# Patient Record
Sex: FEMALE | Race: WHITE | Employment: FULL TIME | URBAN - METROPOLITAN AREA
[De-identification: names, ages, dates, MRNs, and addresses within clinical notes are randomized per-mention and may not be internally consistent; named-entity substitution may affect disease eponyms.]

---

## 2018-07-20 ENCOUNTER — OFFICE VISIT (OUTPATIENT)
Dept: FAMILY MEDICINE CLINIC | Age: 33
End: 2018-07-20
Payer: COMMERCIAL

## 2018-07-20 VITALS
WEIGHT: 138 LBS | RESPIRATION RATE: 16 BRPM | HEIGHT: 60 IN | BODY MASS INDEX: 27.09 KG/M2 | SYSTOLIC BLOOD PRESSURE: 121 MMHG | HEART RATE: 69 BPM | DIASTOLIC BLOOD PRESSURE: 80 MMHG

## 2018-07-20 DIAGNOSIS — Z00.00 WELL ADULT EXAM: Primary | ICD-10-CM

## 2018-07-20 PROCEDURE — 99385 PREV VISIT NEW AGE 18-39: CPT | Performed by: FAMILY MEDICINE

## 2018-07-20 ASSESSMENT — PATIENT HEALTH QUESTIONNAIRE - PHQ9
2. FEELING DOWN, DEPRESSED OR HOPELESS: 0
SUM OF ALL RESPONSES TO PHQ QUESTIONS 1-9: 0
1. LITTLE INTEREST OR PLEASURE IN DOING THINGS: 0
SUM OF ALL RESPONSES TO PHQ9 QUESTIONS 1 & 2: 0

## 2018-07-20 NOTE — PROGRESS NOTES
[facility] PHYSICIANS  COMPREHENSIVE CARE  [facility] [place]  [address] [address]  Dept: [phone]      Elver Thapa is a 29 y.o. female who presents today for follow up on her  medical conditions as noted below. Chief Complaint   Patient presents with   [address]     [facility] care, labs       There is no problem list on file for this patient. No past medical history on file. Past Surgical History:   Procedure Laterality Date    NOSE SURGERY      SKIN CANCER EXCISION       Family History   Problem Relation Age of Onset    Cancer Mother         ovarian cancer    Other Mother         Parkinson       No current outpatient prescriptions on file. No current facility-administered medications for this visit. ALLERGIES:  No Known Allergies    Social History   Substance Use Topics    Smoking status: Never Smoker    Smokeless tobacco: Never Used    Alcohol use Yes      Comment: social        No results found for: LDLCALC, LDLCHOLESTEROL, HDL, BUN, CREATININE, GLUCOSE, LABA1C, LABMICR           Subjective:      HPI  She is here today as a new patient to establish care she is having no complaints or problems she did have an episode of skin cancer and follows with a dermatologist regularly in [place]    Review of Systems:     Constitutional: Negative for fever, appetite change and fatigue. Family social and medical history reviewed and unchanged     HENT: Negative. Negative for nosebleeds, trouble swallowing and neck pain. Eyes: Negative for photophobia and visual disturbance. Respiratory: Negative. Negative for chest tightness and shortness of breath. Cardiovascular: Negative. Negative for chest pain and leg swelling. Gastrointestinal: Negative. Negative for abdominal pain and blood in stool. Endocrine: Negative for cold intolerance and polyuria. Genitourinary: Negative for dysuria and hematuria. Musculoskeletal: Negative. Skin: Negative for rash. Allergic/Immunologic: Negative. Neurological: Negative. Negative for dizziness, weakness and numbness. Hematological: Negative. Negative for adenopathy. Does not bruise/bleed easily. Psychiatric/Behavioral: Negative for sleep disturbance, dysphoric mood and  decreased concentration. The patient is not nervous/anxious. Objective:     Physical Exam:     Nursing note and vitals reviewed. /80   Pulse 69   Resp 16   Ht 5' (1.524 m)   Wt 138 lb (62.6 kg)   Breastfeeding? No   BMI 26.95 kg/m²   Constitutional: She is oriented to person, place, and time. She   appears well-developed and well-nourished. HENT:   Head: Normocephalic and atraumatic. Right Ear: External ear normal. Tympanic membrane is not erythematous. No middle ear effusion. Left Ear: External ear normal. Tympanic membrane is not erythematous. No middle ear effusion. Nose: No mucosal edema. Mouth/Throat: Oropharynx is clear and moist. No posterior oropharyngeal erythema. Eyes: Conjunctivae and EOM are normal. Pupils are equal, round, and reactive to light. Neck: Normal range of motion. Neck supple. No thyromegaly present. Cardiovascular: Normal rate, regular rhythm and normal heart sounds. No murmur heard. Pulmonary/Chest: Effort normal and breath sounds normal. She has no wheezes. Shehas no rales. Abdominal: Soft. Bowel sounds are normal. She exhibits no distension and no mass. There is no tenderness. There is no rebound and no guarding. Genitourinary/Anorectal:deferred  Musculoskeletal: Normal range of motion. She exhibits no edema or tenderness. Lymphadenopathy: She has no cervical adenopathy. Neurological: She is alert and oriented to person, place, and time. She has normal reflexes. Skin: Skin is warm and dry. No rash noted. Psychiatric: She has a normal mood and affect. Her   behavior is normal.       Assessment:      1.  Well adult exam          Plan:      Call or return to clinic prn if these symptoms worsen or fail to improve as anticipated. I have reviewed the instructions with the patient, answering all questions to her satisfaction. No Follow-up on file. Orders Placed This Encounter   Procedures    CBC Auto Differential     Standing Status:   Future     Standing Expiration Date:   7/20/2019    Comprehensive Metabolic Panel     Standing Status:   Future     Standing Expiration Date:   7/20/2019    Lipid Panel     Standing Status:   Future     Standing Expiration Date:   7/20/2019     Order Specific Question:   Is Patient Fasting?/# of Hours     Answer:   yes    T4, Free     Standing Status:   Future     Standing Expiration Date:   7/20/2019    Thyroid Peroxidase Antibody     Standing Status:   Future     Standing Expiration Date:   7/20/2019    TSH without Reflex     Standing Status:   Future     Standing Expiration Date:   7/20/2019    Vitamin D 25 Hydroxy     Standing Status:   Future     Standing Expiration Date:   7/20/2019    PTH, Intact     Standing Status:   Future     Standing Expiration Date:   7/20/2019     No orders of the defined types were placed in this encounter. The laboratory studies and a follow-up p.r.n.   Electronically signed by Mango Westbrook DO on 7/20/2018 at 10:15 AM

## 2018-07-31 ENCOUNTER — OFFICE VISIT (OUTPATIENT)
Dept: OBGYN CLINIC | Age: 33
End: 2018-07-31
Payer: COMMERCIAL

## 2018-07-31 ENCOUNTER — HOSPITAL ENCOUNTER (OUTPATIENT)
Age: 33
Setting detail: SPECIMEN
Discharge: HOME | End: 2018-07-31
Payer: COMMERCIAL

## 2018-07-31 VITALS
HEIGHT: 60 IN | SYSTOLIC BLOOD PRESSURE: 110 MMHG | DIASTOLIC BLOOD PRESSURE: 73 MMHG | BODY MASS INDEX: 27.09 KG/M2 | HEART RATE: 66 BPM | WEIGHT: 138 LBS

## 2018-07-31 DIAGNOSIS — N94.6 DYSMENORRHEA: ICD-10-CM

## 2018-07-31 DIAGNOSIS — Z11.3 SCREENING EXAMINATION FOR STD (SEXUALLY TRANSMITTED DISEASE): ICD-10-CM

## 2018-07-31 DIAGNOSIS — Z01.419 WOMEN'S ANNUAL ROUTINE GYNECOLOGICAL EXAMINATION: Primary | ICD-10-CM

## 2018-07-31 PROBLEM — Z80.41 FAMILY HISTORY OF OVARIAN CANCER: Status: ACTIVE | Noted: 2018-07-31

## 2018-07-31 PROBLEM — Z82.0 FAMILY HISTORY OF PARKINSON DISEASE: Status: ACTIVE | Noted: 2018-07-31

## 2018-07-31 LAB
HIV AG/AB: NONREACTIVE
T. PALLIDUM, IGG: NONREACTIVE

## 2018-07-31 PROCEDURE — 99385 PREV VISIT NEW AGE 18-39: CPT | Performed by: ADVANCED PRACTICE MIDWIFE

## 2018-07-31 RX ORDER — DESOGESTREL AND ETHINYL ESTRADIOL 0.15-0.03
1 KIT ORAL DAILY
Qty: 1 PACKET | Refills: 12 | Status: SHIPPED | OUTPATIENT
Start: 2018-07-31 | End: 2019-03-12 | Stop reason: SDUPTHER

## 2018-07-31 ASSESSMENT — ENCOUNTER SYMPTOMS
RESPIRATORY NEGATIVE: 1
GASTROINTESTINAL NEGATIVE: 1
EYES NEGATIVE: 1

## 2018-08-01 LAB
CHLAMYDIA BY THIN PREP: NEGATIVE
N. GONORRHOEAE DNA, THIN PREP: NEGATIVE

## 2018-08-02 LAB
HPV SAMPLE: NORMAL
HPV SOURCE: NORMAL
HPV, GENOTYPE 16: NOT DETECTED
HPV, GENOTYPE 18: NOT DETECTED
HPV, HIGH RISK OTHER: NOT DETECTED
HPV, INTERPRETATION: NORMAL

## 2018-08-16 LAB — CYTOLOGY REPORT: NORMAL

## 2018-08-20 PROBLEM — R87.612 LOW GRADE SQUAMOUS INTRAEPITHELIAL LESION ON CYTOLOGIC SMEAR OF CERVIX (LGSIL): Status: ACTIVE | Noted: 2018-08-20

## 2018-08-21 ENCOUNTER — TELEPHONE (OUTPATIENT)
Dept: OBGYN CLINIC | Age: 33
End: 2018-08-21

## 2018-08-21 NOTE — TELEPHONE ENCOUNTER
----- Message from Quincy Artis, Valentina Domínguezumas Supa sent at 8/20/2018  4:44 PM EDT -----  Regarding: abnormal pap  Johnkeyonna Louiemary,    Can you call this patient. She has LSIL neg HPV. She will need to schedule with Dr. Bradley Paris for a colpo. Thank you,    [facility]  ----- Message -----  From: Raulito Belcher Incoming Lab Results From [facility]  Sent: 7/31/2018   7:16 PM  To:  VERONICA Pedro CNM

## 2018-08-29 ENCOUNTER — HOSPITAL ENCOUNTER (OUTPATIENT)
Age: 33
Setting detail: SPECIMEN
Discharge: HOME | End: 2018-08-29
Payer: COMMERCIAL

## 2018-08-29 ENCOUNTER — PROCEDURE VISIT (OUTPATIENT)
Dept: OBGYN CLINIC | Age: 33
End: 2018-08-29
Payer: COMMERCIAL

## 2018-08-29 VITALS
WEIGHT: 139 LBS | DIASTOLIC BLOOD PRESSURE: 78 MMHG | HEIGHT: 60 IN | BODY MASS INDEX: 27.29 KG/M2 | HEART RATE: 84 BPM | SYSTOLIC BLOOD PRESSURE: 115 MMHG

## 2018-08-29 DIAGNOSIS — Z32.02 NEGATIVE PREGNANCY TEST: ICD-10-CM

## 2018-08-29 DIAGNOSIS — R87.612 LOW GRADE SQUAMOUS INTRAEPITHELIAL LESION ON CYTOLOGIC SMEAR OF CERVIX (LGSIL): Primary | ICD-10-CM

## 2018-08-29 LAB
CONTROL: PRESENT
PREGNANCY TEST URINE, POC: NEGATIVE

## 2018-08-29 PROCEDURE — 57456 ENDOCERV CURETTAGE W/SCOPE: CPT | Performed by: OBSTETRICS & GYNECOLOGY

## 2018-08-29 PROCEDURE — 81025 URINE PREGNANCY TEST: CPT | Performed by: OBSTETRICS & GYNECOLOGY

## 2018-08-31 LAB — SURGICAL PATHOLOGY REPORT: NORMAL

## 2018-09-05 ENCOUNTER — OFFICE VISIT (OUTPATIENT)
Dept: OBGYN CLINIC | Age: 33
End: 2018-09-05
Payer: COMMERCIAL

## 2018-09-05 VITALS
SYSTOLIC BLOOD PRESSURE: 122 MMHG | HEART RATE: 81 BPM | DIASTOLIC BLOOD PRESSURE: 82 MMHG | BODY MASS INDEX: 26.31 KG/M2 | HEIGHT: 60 IN | WEIGHT: 134 LBS

## 2018-09-05 DIAGNOSIS — R87.612 LOW GRADE SQUAMOUS INTRAEPITHELIAL LESION ON CYTOLOGIC SMEAR OF CERVIX (LGSIL): ICD-10-CM

## 2018-09-05 DIAGNOSIS — R87.613 HGSIL ON CYTOLOGIC SMEAR OF CERVIX: Primary | ICD-10-CM

## 2018-09-05 PROCEDURE — 99213 OFFICE O/P EST LOW 20 MIN: CPT | Performed by: OBSTETRICS & GYNECOLOGY

## 2018-09-07 NOTE — PROGRESS NOTES
bilaterally. Extremities: No calf tenderness, DTR 2/4, and No edema bilaterally    Pelvic: Exam deferred. Assessment:   Diagnosis Orders   1. HGSIL on cytologic smear of cervix     2. Low grade squamous intraepithelial lesion on cytologic smear of cervix (LGSIL)         PLAN:  We discussed the option of LEEP vs. Conservative monitoring in six weeks, patient elects for monitoring and will attempt to improve her overall health in that time frame. Return to the office in 24 weeks. Counseled on preventative health maintenance follow-up. Patient was seen with total face to face time of 15 minutes. More than 50% of this visit was counseling and education regarding The primary encounter diagnosis was HGSIL on cytologic smear of cervix. A diagnosis of Low grade squamous intraepithelial lesion on cytologic smear of cervix (LGSIL) was also pertinent to this visit. and Results   as well as  counseling on preventative health maintenance follow-up.

## 2019-03-12 ENCOUNTER — HOSPITAL ENCOUNTER (OUTPATIENT)
Age: 34
Setting detail: SPECIMEN
Discharge: HOME | End: 2019-03-12
Payer: COMMERCIAL

## 2019-03-12 ENCOUNTER — OFFICE VISIT (OUTPATIENT)
Dept: OBGYN CLINIC | Age: 34
End: 2019-03-12
Payer: COMMERCIAL

## 2019-03-12 VITALS
HEART RATE: 62 BPM | DIASTOLIC BLOOD PRESSURE: 75 MMHG | SYSTOLIC BLOOD PRESSURE: 116 MMHG | BODY MASS INDEX: 29.12 KG/M2 | WEIGHT: 149.13 LBS

## 2019-03-12 DIAGNOSIS — N94.6 DYSMENORRHEA: ICD-10-CM

## 2019-03-12 DIAGNOSIS — R87.612 LOW GRADE SQUAMOUS INTRAEPITHELIAL LESION ON CYTOLOGIC SMEAR OF CERVIX (LGSIL): ICD-10-CM

## 2019-03-12 DIAGNOSIS — Z01.419 WELL WOMAN EXAM WITH ROUTINE GYNECOLOGICAL EXAM: Primary | ICD-10-CM

## 2019-03-12 DIAGNOSIS — R87.613 HGSIL ON CYTOLOGIC SMEAR OF CERVIX: ICD-10-CM

## 2019-03-12 PROCEDURE — 99395 PREV VISIT EST AGE 18-39: CPT | Performed by: OBSTETRICS & GYNECOLOGY

## 2019-03-12 RX ORDER — DESOGESTREL AND ETHINYL ESTRADIOL 0.15-0.03
1 KIT ORAL DAILY
Qty: 1 PACKET | Refills: 12 | Status: SHIPPED | OUTPATIENT
Start: 2019-03-12

## 2019-03-15 LAB
HPV SAMPLE: NORMAL
HPV, GENOTYPE 16: NOT DETECTED
HPV, GENOTYPE 18: NOT DETECTED
HPV, HIGH RISK OTHER: NOT DETECTED
HPV, INTERPRETATION: NORMAL
SPECIMEN DESCRIPTION: NORMAL

## 2019-03-18 DIAGNOSIS — Z00.00 WELL ADULT EXAM: ICD-10-CM

## 2019-03-18 LAB
ALBUMIN SERPL-MCNC: NORMAL G/DL
ALP BLD-CCNC: NORMAL U/L
ALT SERPL-CCNC: NORMAL U/L
ANION GAP SERPL CALCULATED.3IONS-SCNC: NORMAL MMOL/L
AST SERPL-CCNC: NORMAL U/L
BASOPHILS ABSOLUTE: NORMAL /ΜL
BASOPHILS RELATIVE PERCENT: NORMAL %
BILIRUB SERPL-MCNC: NORMAL MG/DL (ref 0.1–1.4)
BUN BLDV-MCNC: NORMAL MG/DL
CALCIUM SERPL-MCNC: NORMAL MG/DL
CHLORIDE BLD-SCNC: NORMAL MMOL/L
CO2: NORMAL MMOL/L
CREAT SERPL-MCNC: NORMAL MG/DL
EOSINOPHILS ABSOLUTE: NORMAL /ΜL
EOSINOPHILS RELATIVE PERCENT: NORMAL %
GFR CALCULATED: NORMAL
GLUCOSE BLD-MCNC: NORMAL MG/DL
HCT VFR BLD CALC: NORMAL % (ref 36–46)
HEMOGLOBIN: NORMAL G/DL (ref 12–16)
LYMPHOCYTES ABSOLUTE: NORMAL /ΜL
LYMPHOCYTES RELATIVE PERCENT: NORMAL %
MCH RBC QN AUTO: NORMAL PG
MCHC RBC AUTO-ENTMCNC: NORMAL G/DL
MCV RBC AUTO: NORMAL FL
MONOCYTES ABSOLUTE: NORMAL /ΜL
MONOCYTES RELATIVE PERCENT: NORMAL %
NEUTROPHILS ABSOLUTE: NORMAL /ΜL
NEUTROPHILS RELATIVE PERCENT: NORMAL %
PDW BLD-RTO: NORMAL %
PLATELET # BLD: NORMAL K/ΜL
PMV BLD AUTO: NORMAL FL
POTASSIUM SERPL-SCNC: NORMAL MMOL/L
PTH INTACT: 29
RBC # BLD: NORMAL 10^6/ΜL
SODIUM BLD-SCNC: NORMAL MMOL/L
T4 FREE: 1
TOTAL PROTEIN: NORMAL
TSH SERPL DL<=0.05 MIU/L-ACNC: 1.79 UIU/ML
VITAMIN D 25-HYDROXY: 17
VITAMIN D2, 25 HYDROXY: NORMAL
VITAMIN D3,25 HYDROXY: NORMAL
WBC # BLD: NORMAL 10^3/ML

## 2019-03-19 ENCOUNTER — PATIENT MESSAGE (OUTPATIENT)
Dept: FAMILY MEDICINE CLINIC | Age: 34
End: 2019-03-19

## 2019-03-25 LAB — CYTOLOGY REPORT: NORMAL

## 2019-04-08 ENCOUNTER — TELEPHONE (OUTPATIENT)
Dept: OBGYN CLINIC | Age: 34
End: 2019-04-08

## 2020-11-12 ENCOUNTER — OFFICE VISIT (OUTPATIENT)
Dept: ORTHOPEDIC SURGERY | Age: 35
End: 2020-11-12
Payer: COMMERCIAL

## 2020-11-12 ENCOUNTER — TELEPHONE (OUTPATIENT)
Dept: ORTHOPEDIC SURGERY | Age: 35
End: 2020-11-12

## 2020-11-12 VITALS
TEMPERATURE: 99.3 F | HEIGHT: 60 IN | HEART RATE: 62 BPM | BODY MASS INDEX: 28.47 KG/M2 | WEIGHT: 145 LBS | RESPIRATION RATE: 12 BRPM

## 2020-11-12 PROCEDURE — 20550 NJX 1 TENDON SHEATH/LIGAMENT: CPT | Performed by: ORTHOPAEDIC SURGERY

## 2020-11-12 PROCEDURE — 99203 OFFICE O/P NEW LOW 30 MIN: CPT | Performed by: ORTHOPAEDIC SURGERY

## 2020-11-12 RX ORDER — METHYLPREDNISOLONE 4 MG/1
TABLET ORAL
COMMUNITY
Start: 2020-11-06

## 2020-11-12 RX ORDER — METHYLPREDNISOLONE ACETATE 40 MG/ML
40 INJECTION, SUSPENSION INTRA-ARTICULAR; INTRALESIONAL; INTRAMUSCULAR; SOFT TISSUE ONCE
Status: COMPLETED | OUTPATIENT
Start: 2020-11-12 | End: 2020-11-12

## 2020-11-12 RX ORDER — IBUPROFEN 800 MG/1
800 TABLET ORAL EVERY 6 HOURS PRN
COMMUNITY
Start: 2020-11-06

## 2020-11-12 RX ORDER — LIDOCAINE HYDROCHLORIDE 10 MG/ML
4 INJECTION, SOLUTION INFILTRATION; PERINEURAL ONCE
Status: COMPLETED | OUTPATIENT
Start: 2020-11-12 | End: 2020-11-12

## 2020-11-12 RX ADMIN — LIDOCAINE HYDROCHLORIDE 4 ML: 10 INJECTION, SOLUTION INFILTRATION; PERINEURAL at 12:56

## 2020-11-12 RX ADMIN — METHYLPREDNISOLONE ACETATE 40 MG: 40 INJECTION, SUSPENSION INTRA-ARTICULAR; INTRALESIONAL; INTRAMUSCULAR; SOFT TISSUE at 12:56

## 2020-11-12 ASSESSMENT — ENCOUNTER SYMPTOMS
COUGH: 0
ABDOMINAL PAIN: 0
CHEST TIGHTNESS: 0
APNEA: 0
ABDOMINAL DISTENTION: 0
VOMITING: 0
NAUSEA: 0
DIARRHEA: 0
COLOR CHANGE: 0
CONSTIPATION: 0
SHORTNESS OF BREATH: 0

## 2020-11-12 NOTE — PROGRESS NOTES
[address] [facility]  [address] [place] [id number]  Dept: [phone]  Dept Fax: [phone]        Bilateral Foot: New Patient    Subjective:     Chief Complaint   Patient presents with    Foot Pain     Bilateral Heel     HPI:     Amanda Guzman presents today with bilateral foot pain. The patient is a Manager at [employer]. The left foot is worse than the right. The pain has been present since 09/01/2020, but the past couple of weeks have progressively gotten worse. The patient recalls no specific injury or trauma to either of the feet. The patient went to the [facility] on 11/06/2020 because of increasing pain in both feet. She was given Motrin 800 mgs and Methylprednisolone 4 mg to take as needed. The patient has tried the Motrin (3x daily) and the steroid pack with no improvement. The patient has also tried rolling out on a frozen water bottle, she bought new shoes at [address] [place] [address] [employer], soaked her feet in an ice bucket, and wore a night splint with some improvement. The patient got a neutral shoe from [address] [place], [place] [address] so that it will allow her to put an orthotic in it. The patient tried to go back to work yesterday, but had to leave early due to pain. The pain is now described as Carlos Artemio and shooting. The patient cannot be on her feet longer than an hour. There is significant pain with prolonged weight bearing. The feet have not swelled. There is no painful popping and clicking. It is stiff upon arising from sitting. It is painful to go up and down stairs and sit for a prolonged time. The patient has not had a cortisone injection. The patient has not tried physical therapy. The patient has not has surgery. The opposite foot is not okay. The patient no longer runs or exercises because of the pain. ROS:   Review of Systems   Constitutional: Positive for activity change. Negative for appetite change, fatigue and fever. Respiratory: Negative for apnea, cough, chest tightness and shortness of breath. Cardiovascular: Negative for chest pain, palpitations and leg swelling. Gastrointestinal: Negative for abdominal distention, abdominal pain, constipation, diarrhea, nausea and vomiting. Genitourinary: Negative for difficulty urinating, dysuria and hematuria. Musculoskeletal: Positive for myalgias. Negative for arthralgias, gait problem and joint swelling. Skin: Negative for color change and rash. Neurological: Negative for dizziness, weakness, numbness and headaches. Psychiatric/Behavioral: Negative for sleep disturbance. Past Medical History:    No past medical history on file. Past Surgical History:    Past Surgical History:   Procedure Laterality Date    NOSE SURGERY      SKIN CANCER EXCISION         CurrentMedications:   Current Outpatient Medications   Medication Sig Dispense Refill    ibuprofen (ADVIL;MOTRIN) 800 MG tablet Take 800 mg by mouth every 6 hours as needed      methylPREDNISolone (MEDROL DOSEPACK) 4 MG tablet follow package directions      desogestrel-ethinyl estradiol (DESOGEN) 0.15-30 MG-MCG per tablet Take 1 tablet by mouth daily 1 packet 12     No current facility-administered medications for this visit. Allergies:    Patient has no known allergies.     Social History:   Social History     Socioeconomic History    Marital status: Single     Spouse name: None    Number of children: None    Years of education: None    Highest education level: None   Occupational History    None   Social Needs    Financial resource strain: None    Food insecurity     Worry: None     Inability: None    Transportation needs     Medical: None     Non-medical: None   Tobacco Use    Smoking status: Never Smoker    Smokeless tobacco: Never Used   Substance and Sexual Activity    Alcohol use: Yes     Comment: social    Drug use: No    Sexual activity: Yes     Partners: Male     Birth control/protection: Condom, Pill   Lifestyle    Physical activity     Days per week: None     Minutes per session: None    Stress: None   Relationships    Social connections     Talks on phone: None     Gets together: None     Attends Rastafarian service: None     Active member of club or organization: None     Attends meetings of clubs or organizations: None     Relationship status: None    Intimate partner violence     Fear of current or ex partner: None     Emotionally abused: None     Physically abused: None     Forced sexual activity: None   Other Topics Concern    None   Social History Narrative    None       Family History:  Family History   Problem Relation Age of Onset    Cancer Mother         ovarian cancer    Other Mother         Parkinson       I have reviewed the CC, HPI, ROS, PMH, FHX, Social History, and if not present in this note, I have reviewed in the patient's chart. I agree with the documentation provided by other staff and have reviewed their documentation prior to providing my signature indicating agreement. Vitals:   Pulse 62   Temp 99.3 °F (37.4 °C)   Resp 12   Ht 5' (1.524 m)   Wt 145 lb (65.8 kg)   BMI 28.32 kg/m²  Body mass index is 28.32 kg/m². Physical Examination:     Orthopedics:    GENERAL: Alert and oriented X3 in no acute distress. SKIN: Intact without lesions or ulcerations. NEURO: Musculoskeletal and axillary nerves intact to sensory and motor testing. VASC: Capillary refill is less than 3 seconds. Bilateral Feet    LOC: Bilateral Feet  GEN: Alert and oriented X 3 and in no acute distress. The patient's gait was observed and noted to be non antalgic. SKIN: Intact without rashes, lesions, or ulcerations. Nails are not dystrophic. NEURO: Sensation to the foot is intact. VASC: Capillary refill is less than three seconds. Distal pulses are are palpable. There is no lymphadenopathy.   INSPECTION: Reveals no effusion, swelling is absent, edema is absent, ecchymoses is absent. The foot is in slight pes planus alignment. ROM: Stiff bilateral subtalar joint noted. Foot supination and pronation are full and non-painful. TESTS: Anterior drawer testing is negative. Calcaneal inversion testing is negative. The patient is  able to single toe raise. Too many toe signs is absent. Heelcord tightness is is not observed. The patient can single leg hop. No ankle instability noted. PALP: Palpation reveals pain over the plantar fascia insertion bilaterally. Assessment:     1. Plantar fasciitis, bilateral      Procedures:    Procedure: yes    Plantar Fascia Injection    Location: Left Foot    Procedure: The point of maximum tenderness was identified by palpation and marked with a hollow end of a click type ball-point pen, on the Left foot/heel pad. The skin was prepped with a Betadine swab in a sterile fashion. The tendinous insertion was injected under sterile technique with a 5 cc solution containing 4cc of 1% of lidocaine and 1 cc containing 40mg of Depo Medrol. The skin was cleansed and a Band-Aid was placed. The patient tolerated the procedure without difficulty. The patient was told to watch for signs of infection and to call immediately with any problems. .    Radiology:   X-rays taken today were reviewed with the patient. FOOT X-RAY    Three views of the bilateral foot including   weightbearing AP, oblique and lateral views reveal   no osseous or soft tissue abnormality. Alignment is satisfactory. There is no evidence of fracture or dislocation or bony erosion. Lisfranc's joint is reduced with no widening. Impression: Negative x-rays of the bilateral foot. Plan:   Treatment : I reviewed the X-ray with the patient. We discussed the etiologies and natural histories of plantar fascitis of both feet.  We discussed the various treatment alternatives including anti-inflammatory medications, physical therapy, injections, further imaging studies and as a last one week. The patient can do this cortisone injection once every 3 months as needed. If the injections stop working and do not give the patient relief the patient should consider surgical interventions to produce long term relief. A night splint brace was ordered for the patient to provide pain control and good joint stabilization of the left plantar fasica. The brace will improve the ADL's for the patient so they may have a better quality of life and it will reduce the pain they are experiencing. I also told the patient that we will write her off of work until 11/17/2020 so that she gives the injection time to work before she is on her feet for long periods of time. A physical therapy prescription was given. Patient should return to the clinic PRN to follow up with Laney Kelley D.O. . The patient will call the office immediately with any problems. No orders of the defined types were placed in this encounter. No orders of the defined types were placed in this encounter. Mechelle Najera MS, HALLIE CUEVA, am scribing for and in the presence of Laney LIU. 11/12/2020  9:54 AM        Electronically signed by Sánchez Sandoval ATC, on 11/12/2020 at 9:54 AM               I, Laney Kelley DO, have personally seen this patient and I have reviewed the CC, PMH, FHX and Social History as provided by other clinical staff. I reassessed the HPI and ROS as scribed by Sánchez Sandoval ATC in my presence and it is both accurate and complete. Thereafter, I personally performed the PE, reviewed the imaging and established the DX and POC. I agree with the documentation provided by the . I have reviewed all documentation in its entirety prior to providing my signature indicating agreement. Any areas of disagreement are noted on the chart.     Electronically signed by Christa Valenzuela DO on 11/15/2020 at 1:58 PM

## 2020-11-12 NOTE — PATIENT INSTRUCTIONS
Patient Education        Plantar Fasciitis: Care Instructions  Your Care Instructions     Plantar fasciitis is pain and inflammation of the plantar fascia, the tissue at the bottom of your foot that connects the heel bone to the toes. The plantar fascia also supports the arch. If you strain the plantar fascia, it can develop small tears and cause heel pain when you stand or walk. Plantar fasciitis can be caused by running or other sports. It also may occur in people who are overweight or who have high arches or flat feet. You may get plantar fasciitis if you walk or stand for long periods, or have a tight Achilles tendon or calf muscles. You can improve your foot pain with rest and other care at home. It might take a few weeks to a few months for your foot to heal completely. Follow-up care is a key part of your treatment and safety. Be sure to make and go to all appointments, and call your doctor if you are having problems. It's also a good idea to know your test results and keep a list of the medicines you take. How can you care for yourself at home? · Rest your feet often. Reduce your activity to a level that lets you avoid pain. If possible, do not run or walk on hard surfaces. · Take pain medicines exactly as directed. ? If the doctor gave you a prescription medicine for pain, take it as prescribed. ? If you are not taking a prescription pain medicine, take an over-the-counter anti-inflammatory medicine for pain and swelling, such as ibuprofen (Advil, Motrin) or naproxen (Aleve). Read and follow all instructions on the label. · Use ice massage to help with pain and swelling. You can use an ice cube or an ice cup several times a day. To make an ice cup, fill a paper cup with water and freeze it. Cut off the top of the cup until a half-inch of ice shows. Hold onto the remaining paper to use the cup. Rub the ice in small circles over the area for 5 to 7 minutes.   · Contrast baths, which alternate hot and cold water, can also help reduce swelling. But because heat alone may make pain and swelling worse, end a contrast bath with a soak in cold water. · Wear a night splint if your doctor suggests it. A night splint holds your foot with the toes pointed up and the foot and ankle at a 90-degree angle. This position gives the bottom of your foot a constant, gentle stretch. · Do simple exercises such as calf stretches and towel stretches 2 to 3 times each day, especially when you first get up in the morning. These can help the plantar fascia become more flexible. They also make the muscles that support your arch stronger. Hold these stretches for 15 to 30 seconds per stretch. Repeat 2 to 4 times. ? Stand about 1 foot from a wall. Place the palms of both hands against the wall at chest level. Lean forward against the wall, keeping one leg with the knee straight and heel on the ground while bending the knee of the other leg.  ? Sit down on the floor or a mat with your feet stretched in front of you. Roll up a towel lengthwise, and loop it over the ball of your foot. Holding the towel at both ends, gently pull the towel toward you to stretch your foot. · Wear shoes with good arch support. Athletic shoes or shoes with a well-cushioned sole are good choices. · Try heel cups or shoe inserts (orthotics) to help cushion your heel. You can buy these at many shoe stores. · Put on your shoes as soon as you get out of bed. Going barefoot or wearing slippers may make your pain worse. · Reach and stay at a good weight for your height. This puts less strain on your feet. When should you call for help? Call your doctor now or seek immediate medical care if:    · You have heel pain with fever, redness, or warmth in your heel.     · You cannot put weight on the sore foot.    Watch closely for changes in your health, and be sure to contact your doctor if:    · You have numbness or tingling in your heel.     · Your heel pain lasts more than 2 weeks. Where can you learn more? Go to https://chpepiceweb.[email or web address] org and sign in to your [employer] account. Enter T295 in the [facility] box to learn more about \"Plantar Fasciitis: Care Instructions. \"     If you do not have an account, please click on the \"Sign Up Now\" link. Current as of: March 2, 2020               Content Version: 12.6  © 2006-2020 "[employer]. Care instructions adapted under license by [facility] ([facility]). If you have questions about a medical condition or this instruction, always ask your healthcare professional. [address] any warranty or liability for your use of this information. Patient Education        Plantar Fasciitis: Exercises  Introduction  Here are some examples of exercises for you to try. The exercises may be suggested for a condition or for rehabilitation. Start each exercise slowly. Ease off the exercises if you start to have pain. You will be told when to start these exercises and which ones will work best for you. How to do the exercises  Towel stretch   1. Sit with your legs extended and knees straight. 2. Place a towel around your foot just under the toes. 3. Hold each end of the towel in each hand, with your hands above your knees. 4. Pull back with the towel so that your foot stretches toward you. 5. Hold the position for at least 15 to 30 seconds. 6. Repeat 2 to 4 times a session, up to 5 sessions a day. Calf stretch   This exercise stretches the muscles at the back of the lower leg (the calf) and the Achilles tendon. Do this exercise 3 or 4 times a day, 5 days a week. 1. Stand facing a wall with your hands on the wall at about eye level. Put the leg you want to stretch about a step behind your other leg. 2. Keeping your back heel on the floor, bend your front knee until you feel a stretch in the back leg. 3. Hold the stretch for 15 to 30 seconds. Repeat 2 to 4 times.     Plantar fascia and calf stretch   Stretching the plantar fascia and calf muscles can increase flexibility and decrease heel pain. You can do this exercise several times each day and before and after activity. 1. Stand on a step as shown above. Be sure to hold on to the banister. 2. Slowly let your heels down over the edge of the step as you relax your calf muscles. You should feel a gentle stretch across the bottom of your foot and up the back of your leg to your knee. 3. Hold the stretch about 15 to 30 seconds, and then tighten your calf muscle a little to bring your heel back up to the level of the step. Repeat 2 to 4 times. Towel curls   Make this exercise more challenging by placing a weighted object, such as a soup can, on the other end of the towel. 1. While sitting, place your foot on a towel on the floor and scrunch the towel toward you with your toes. 2. Then, also using your toes, push the towel away from you. [place] pickups   1. Put marbles on the floor next to a cup.  2. Using your toes, try to lift the marbles up from the floor and put them in the cup. Follow-up care is a key part of your treatment and safety. Be sure to make and go to all appointments, and call your doctor if you are having problems. It's also a good idea to know your test results and keep a list of the medicines you take. Where can you learn more? Go to https://[employer].Getable. org and sign in to your [employer] account. Enter M945 in the [facility] box to learn more about \"Plantar Fasciitis: Exercises. \"     If you do not have an account, please click on the \"Sign Up Now\" link. Current as of: March 2, 2020               Content Version: 12.6  © 2254-7695 "[employer]. Care instructions adapted under license by [facility] ([facility]).  If you have questions about a medical condition or this instruction, always ask your healthcare professional. Reji Sher disclaims any warranty or liability for your use of this information. Patient Education        Arch Pain: Exercises  Introduction  Here are some examples of exercises for you to try. The exercises may be suggested for a condition or for rehabilitation. Start each exercise slowly. Ease off the exercises if you start to have pain. You will be told when to start these exercises and which ones will work best for you. How to do the exercises  Plantar fascia stretch   7. Sit in a chair and put your affected foot on your other knee. 8. Hold the heel of your foot in one hand, and grasp your toes with the other hand. 9. Pull on your heel (toward your body), and at the same time pull your toes back with your other hand. 10. You should feel a stretch along the bottom of your foot. 11. Hold 15 to 30 seconds. 12. Repeat 2 to 4 times. Plantar fascia stretch (kneeling)   You may want to place a pillow under your knees for this exercise. 4. Get on your hands and knees on the floor. Keep your heels pointing up and the balls of your feet and your toes on the floor. 5. Slowly sit back toward your ankles. 6. If this is too hard, you can try doing it one leg at a time. Stand up, and then kneel on one knee and keep the other leg forward. Place the foot of your forward leg flat on the ground and bend that knee. The heel on the leg still behind you should point up. The ball and toes of that foot should be on the floor. Sit back toward that ankle. 7. Hold 15 to 30 seconds. 8. Repeat 2 to 4 times. Switch legs if you are doing this one leg at a time. Plantar fascia self-massage   4. Sit in a chair. 5. Place your affected foot on a firm, tube-shaped object, such as a can or water bottle. 6. Roll your foot back and forth over the object to massage the bottom of your foot. 7. If you want to do ice massage, fill a water bottle about three-fourths of the way full and freeze before using. 8. Continue for 2 to 5 minutes.     Bilateral calf stretch (knees straight)   3. Place a book on the floor a few inches from a wall or countertop, and put the balls of your feet on it. Your heels should be on the floor. The book needs to be thick enough so that you can feel a gentle stretch in each calf. If you are not steady on your feet, hold on to a chair, counter, or wall while you do this stretch. 4. Keep your knees straight, and lean forward until you feel a stretch in each calf. 5. To get more stretch, add another book or use a thicker book, such as a phone book, a dictionary, or an encyclopedia. 6. Hold the stretch for at least 15 to 30 seconds. 7. Repeat 2 to 4 times. Bilateral calf stretch (knees bent)   3. Place a book on the floor a few inches from a wall or countertop, and put the balls of your feet on it. Your heels should be on the floor. The book needs to be thick enough so that you can feel a gentle stretch in each calf. If you are not steady on your feet, hold on to a chair, counter, or wall while you do this stretch. 4. Bend your knees, and lean forward until you feel a stretch in each calf. 5. To get more stretch, add another book or use a thicker book, such as a phone book, a dictionary, or an encyclopedia. 6. Hold the stretch for at least 15 to 30 seconds. 7. Repeat 2 to 4 times. [place] pick-ups   1. Put some marbles on the floor next to a cup.  2. Sit down, and use the toes of your affected foot to lift up one marble from the floor at a time. Then try to put the marble in the cup.  3. Repeat 8 to 12 times. Towel scrunches   1. Sit down, and place your affected foot on a towel on the floor. You may also do this with both feet on the towel. 2. Scrunch the towel toward you with your toes. Then use your toes to push the towel back into place. 3. Repeat 8 to 12 times. Heel raises on a step   1. Stand on the bottom step of a staircase, facing up toward the stairs. Put the balls of your feet on the step.  If you are not steady on your feet, hold on to the banister or wall. 2. Keeping both knees straight, slowly lift your heels above the step so that you are standing on your toes. Then slowly lower your heels below the step and toward the floor. 3. Return to the starting position, with your feet even with the step. 4. Repeat 8 to 12 times. Follow-up care is a key part of your treatment and safety. Be sure to make and go to all appointments, and call your doctor if you are having problems. It's also a good idea to know your test results and keep a list of the medicines you take. Where can you learn more? Go to https://TyklipeOnformonics.U-Planner.com. org and sign in to your [email or web address] account. Enter H119 in the [facility] box to learn more about \"Arch Pain: Exercises. \"     If you do not have an account, please click on the \"Sign Up Now\" link. Current as of: March 2, 2020               Content Version: 12.6  © 2006-2020 [employer]. Care instructions adapted under license by [facility] ([facility]). If you have questions about a medical condition or this instruction, always ask your healthcare professional. [place] [address] any warranty or liability for your use of this information. CORTISONE INJECTION CARE    The injection site should never get red, hot, or swollen and if it does the patient will contact our office right away. The patient may experience a increase in soreness the first 24-48 hours due to a cortisone flair and can take anti-inflammatories for a short period of time to reduce that soreness. The patient should not submerge the injection site in water for a minimum of 24 hours to avoid infection. This means no lakes, pools, ponds, or hot tubs for 24 hours. If the patient is diabetic the injection may increase their blood sugar for up to one week. The patient can do this cortisone injection once every 3 months as needed.

## 2020-11-12 NOTE — LETTER
[address] [place] [address] and [place] [address]  Phone: [phone]  Fax: Padma,         November 12, 2020     Patient: Radha Chauhan   YOB: 1985   Date of Visit: 11/12/2020       To Whom It May Concern: It is my medical opinion that Radha Chauhan may return to work on 11/17/2020. If you have any questions or concerns, please don't hesitate to call.     Sincerely,        Sterling Hutchinson DO

## 2020-12-06 ENCOUNTER — APPOINTMENT (OUTPATIENT)
Dept: CT IMAGING | Facility: CLINIC | Age: 35
End: 2020-12-06
Payer: COMMERCIAL

## 2020-12-06 ENCOUNTER — HOSPITAL ENCOUNTER (EMERGENCY)
Facility: CLINIC | Age: 35
Discharge: HOME | End: 2020-12-06
Attending: EMERGENCY MEDICINE
Payer: COMMERCIAL

## 2020-12-06 VITALS
WEIGHT: 145 LBS | HEIGHT: 60 IN | SYSTOLIC BLOOD PRESSURE: 134 MMHG | RESPIRATION RATE: 15 BRPM | HEART RATE: 82 BPM | DIASTOLIC BLOOD PRESSURE: 87 MMHG | OXYGEN SATURATION: 99 % | TEMPERATURE: 98.3 F | BODY MASS INDEX: 28.47 KG/M2

## 2020-12-06 PROCEDURE — 70450 CT HEAD/BRAIN W/O DYE: CPT

## 2020-12-06 PROCEDURE — 99285 EMERGENCY DEPT VISIT HI MDM: CPT

## 2020-12-06 PROCEDURE — 72125 CT NECK SPINE W/O DYE: CPT

## 2020-12-06 ASSESSMENT — ENCOUNTER SYMPTOMS
NAUSEA: 0
ABDOMINAL PAIN: 0
DIARRHEA: 0
EYE PAIN: 0
COUGH: 0
VOMITING: 0
BACK PAIN: 0
SHORTNESS OF BREATH: 0
SORE THROAT: 0
RHINORRHEA: 0

## 2020-12-06 ASSESSMENT — PAIN DESCRIPTION - FREQUENCY: FREQUENCY: CONTINUOUS

## 2020-12-06 ASSESSMENT — PAIN DESCRIPTION - ORIENTATION: ORIENTATION: LEFT

## 2020-12-06 ASSESSMENT — PAIN DESCRIPTION - PAIN TYPE: TYPE: ACUTE PAIN

## 2020-12-06 ASSESSMENT — PAIN DESCRIPTION - LOCATION: LOCATION: HEAD

## 2020-12-06 ASSESSMENT — PAIN DESCRIPTION - DESCRIPTORS: DESCRIPTORS: THROBBING

## 2020-12-06 ASSESSMENT — PAIN SCALES - GENERAL: PAINLEVEL_OUTOF10: 3

## 2021-01-07 ENCOUNTER — TELEMEDICINE (OUTPATIENT)
Dept: FAMILY MEDICINE CLINIC | Age: 36
End: 2021-01-07
Payer: COMMERCIAL

## 2021-01-07 DIAGNOSIS — F43.21 GRIEF REACTION: Primary | ICD-10-CM

## 2021-01-07 PROCEDURE — 99213 OFFICE O/P EST LOW 20 MIN: CPT | Performed by: FAMILY MEDICINE

## 2021-01-07 RX ORDER — ALPRAZOLAM 0.5 MG/1
0.5 TABLET ORAL NIGHTLY PRN
Qty: 15 TABLET | Refills: 0 | Status: SHIPPED | OUTPATIENT
Start: 2021-01-07 | End: 2021-02-06

## 2021-01-07 RX ORDER — CITALOPRAM 20 MG/1
20 TABLET ORAL DAILY
Qty: 30 TABLET | Refills: 11 | Status: SHIPPED | OUTPATIENT
Start: 2021-01-07

## 2021-01-07 SDOH — ECONOMIC STABILITY: INCOME INSECURITY: HOW HARD IS IT FOR YOU TO PAY FOR THE VERY BASICS LIKE FOOD, HOUSING, MEDICAL CARE, AND HEATING?: NOT HARD AT ALL

## 2021-01-07 SDOH — ECONOMIC STABILITY: FOOD INSECURITY: WITHIN THE PAST 12 MONTHS, THE FOOD YOU BOUGHT JUST DIDN'T LAST AND YOU DIDN'T HAVE MONEY TO GET MORE.: NEVER TRUE

## 2021-01-07 SDOH — ECONOMIC STABILITY: TRANSPORTATION INSECURITY
IN THE PAST 12 MONTHS, HAS LACK OF TRANSPORTATION KEPT YOU FROM MEETINGS, WORK, OR FROM GETTING THINGS NEEDED FOR DAILY LIVING?: NOT ASKED

## 2021-01-07 ASSESSMENT — PATIENT HEALTH QUESTIONNAIRE - PHQ9
1. LITTLE INTEREST OR PLEASURE IN DOING THINGS: 0
SUM OF ALL RESPONSES TO PHQ QUESTIONS 1-9: 0
SUM OF ALL RESPONSES TO PHQ QUESTIONS 1-9: 0
SUM OF ALL RESPONSES TO PHQ9 QUESTIONS 1 & 2: 0

## 2021-01-07 NOTE — PROGRESS NOTES
Substance Use Topics    Alcohol use: Yes     Comment: social    Drug use: No   ,   Family History   Problem Relation Age of Onset    Cancer Mother         ovarian cancer    Other Mother         Parkinson       PHYSICAL EXAMINATION:  [ INSTRUCTIONS:  \"[x]\" Indicates a positive item  \"[]\" Indicates a negative item  -- DELETE ALL ITEMS NOT EXAMINED]  Vital Signs: (As obtained by patient/caregiver or practitioner observation)    Blood pressure-  Heart rate-    Respiratory rate-    Temperature-  Pulse oximetry-     Constitutional: [x] Appears well-developed and well-nourished [x] No apparent distress      [] Abnormal-   Mental status  [x] Alert and awake  [x] Oriented to person/place/time [x]Able to follow commands      Eyes:  EOM    [x]  Normal  [] Abnormal-  Sclera  [x]  Normal  [] Abnormal -         Discharge [x]  None visible  [] Abnormal -    HENT:   [x] Normocephalic, atraumatic. [] Abnormal   [x] Mouth/Throat: Mucous membranes are moist.     External Ears [x] Normal  [] Abnormal-     Neck: [x] No visualized mass     Pulmonary/Chest: [x] Respiratory effort normal.  [x] No visualized signs of difficulty breathing or respiratory distress        [] Abnormal-      Musculoskeletal:   [x] Normal gait with no signs of ataxia         [x] Normal range of motion of neck        [] Abnormal-       Neurological:        [x] No Facial Asymmetry (Cranial nerve 7 motor function) (limited exam to video visit)          [x] No gaze palsy        [] Abnormal-         Skin:        [x] No significant exanthematous lesions or discoloration noted on facial skin         [] Abnormal-            Psychiatric:       [x] Normal Affect [x] No Hallucinations        [] Abnormal-     Other pertinent observable physical exam findings-     ASSESSMENT/PLAN:   Diagnosis Orders   1. Grief reaction  ALPRAZolam (XANAX) 0.5 MG tablet      No orders of the defined types were placed in this encounter.     Requested Prescriptions Signed Prescriptions Disp Refills    citalopram (CELEXA) 20 MG tablet 30 tablet 11     Sig: Take 1 tablet by mouth daily    ALPRAZolam (XANAX) 0.5 MG tablet 15 tablet 0     Sig: Take 1 tablet by mouth nightly as needed for Sleep for up to 30 days. No follow-ups on file. Severiano Balding is a 29 y.o. female being evaluated by a Virtual Visit (video visit) encounter to address concerns as mentioned above. A caregiver was present when appropriate. Due to this being a TeleHealth encounter (During QOLPI-00 public health emergency), evaluation of the following organ systems was limited: Vitals/Constitutional/EENT/Resp/CV/GI//MS/Neuro/Skin/Heme-Lymph-Imm. Pursuant to the emergency declaration under the [address] [place] [address], [address] [place] [address] authority and the [employer] and [employer] Act, this Virtual Visit was conducted with patient's (and/or legal guardian's) consent, to reduce the patient's risk of exposure to COVID-19 and provide necessary medical care. The patient (and/or legal guardian) has also been advised to contact this office for worsening conditions or problems, and seek emergency medical treatment and/or call 911 if deemed necessary. Patient identification was verified at the start of the visit: Yes    Total time spent on this encounter: Not billed by time    Services were provided through a video synchronous discussion virtually to substitute for in-person clinic visit. Patient and provider were located at their individual homes. --aLzaro Roland DO on 1/7/2021 at 10:33 AM    An electronic signature was used to authenticate this note.

## 2021-02-23 ENCOUNTER — TELEMEDICINE (OUTPATIENT)
Dept: FAMILY MEDICINE CLINIC | Age: 36
End: 2021-02-23
Payer: COMMERCIAL

## 2021-02-23 ENCOUNTER — TELEPHONE (OUTPATIENT)
Dept: FAMILY MEDICINE CLINIC | Age: 36
End: 2021-02-23

## 2021-02-23 DIAGNOSIS — F41.9 ANXIETY: ICD-10-CM

## 2021-02-23 DIAGNOSIS — F43.21 GRIEF REACTION: Primary | ICD-10-CM

## 2021-02-23 PROCEDURE — 99213 OFFICE O/P EST LOW 20 MIN: CPT | Performed by: FAMILY MEDICINE

## 2021-02-23 RX ORDER — DULOXETIN HYDROCHLORIDE 60 MG/1
60 CAPSULE, DELAYED RELEASE ORAL DAILY
Qty: 30 CAPSULE | Refills: 11 | Status: SHIPPED | OUTPATIENT
Start: 2021-02-23

## 2021-02-23 ASSESSMENT — PATIENT HEALTH QUESTIONNAIRE - PHQ9
SUM OF ALL RESPONSES TO PHQ QUESTIONS 1-9: 0
SUM OF ALL RESPONSES TO PHQ9 QUESTIONS 1 & 2: 0
2. FEELING DOWN, DEPRESSED OR HOPELESS: 0

## 2021-02-23 NOTE — PROGRESS NOTES
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 Authorizing Provider   DULoxetine (CYMBALTA) 60 MG extended release capsule Take 1 capsule by mouth daily Yes Shira Staples DO   citalopram (CELEXA) 20 MG tablet Take 1 tablet by mouth daily  Shira Staples DO   ibuprofen (ADVIL;MOTRIN) 800 MG tablet Take 800 mg by mouth every 6 hours as needed  Historical Provider, MD   methylPREDNISolone (MEDROL DOSEPACK) 4 MG tablet follow package directions  Historical Provider, MD   desogestrel-ethinyl estradiol (DESOGEN) 0.15-30 MG-MCG per tablet Take 1 tablet by mouth daily  Patient not taking: Reported on 2021  Manuel Luong MD       Social History     Tobacco Use    Smoking status: Former Smoker     Packs/day: 0.50     Years: 10.00     Pack years: 5.00     Types: Cigarettes     Quit date: 2020     Years since quittin.1    Smokeless tobacco: Never Used   Substance Use Topics    Alcohol use: Yes     Comment: social    Drug use: No        No Known Allergies, History reviewed. No pertinent past medical history. ,   Past Surgical History: Procedure Laterality Date    NOSE SURGERY      SKIN CANCER EXCISION     ,   Social History     Tobacco Use    Smoking status: Former Smoker     Packs/day: 0.50     Years: 10.00     Pack years: 5.00     Types: Cigarettes     Quit date: 2020     Years since quittin.1    Smokeless tobacco: Never Used   Substance Use Topics    Alcohol use: Yes     Comment: social    Drug use: No   ,   Family History   Problem Relation Age of Onset    Cancer Mother         ovarian cancer    Other Mother         Parkinson       PHYSICAL EXAMINATION:  [ INSTRUCTIONS:  \"[x]\" Indicates a positive item  \"[]\" Indicates a negative item  -- DELETE ALL ITEMS NOT EXAMINED]  Vital Signs: (As obtained by patient/caregiver or practitioner observation)    Blood pressure-  Heart rate-    Respiratory rate-    Temperature-  Pulse oximetry-     Constitutional: [x] Appears well-developed and well-nourished [x] No apparent distress      [] Abnormal-   Mental status  [x] Alert and awake  [x] Oriented to person/place/time [x]Able to follow commands      Eyes:  EOM    [x]  Normal  [] Abnormal-  Sclera  [x]  Normal  [] Abnormal -         Discharge [x]  None visible  [] Abnormal -    HENT:   [x] Normocephalic, atraumatic.   [] Abnormal   [x] Mouth/Throat: Mucous membranes are moist.     External Ears [x] Normal  [] Abnormal-     Neck: [x] No visualized mass     Pulmonary/Chest: [x] Respiratory effort normal.  [x] No visualized signs of difficulty breathing or respiratory distress        [] Abnormal-      Musculoskeletal:   [x] Normal gait with no signs of ataxia         [x] Normal range of motion of neck        [] Abnormal-       Neurological:        [x] No Facial Asymmetry (Cranial nerve 7 motor function) (limited exam to video visit)          [x] No gaze palsy        [] Abnormal-         Skin:        [x] No significant exanthematous lesions or discoloration noted on facial skin         [] Abnormal- Psychiatric:       [x] Normal Affect [x] No Hallucinations        [] Abnormal-     Other pertinent observable physical exam findings-     ASSESSMENT/PLAN:   Diagnosis Orders   1. Grief reaction     2. Anxiety       No orders of the defined types were placed in this encounter. Requested Prescriptions     Signed Prescriptions Disp Refills    DULoxetine (CYMBALTA) 60 MG extended release capsule 30 capsule 11     Sig: Take 1 capsule by mouth daily     She is going to grief counseling suggested she continue with that I think she needs to continue at work right now because it lease is keeping her mind occupied if she feels she really cannot function I be happy to take her off    Return if symptoms worsen or fail to improve. Isidra Best is a 29 y.o. female being evaluated by a Virtual Visit (video visit) encounter to address concerns as mentioned above. A caregiver was present when appropriate. Due to this being a TeleHealth encounter (During DQURN-45 public health emergency), evaluation of the following organ systems was limited: Vitals/Constitutional/EENT/Resp/CV/GI//MS/Neuro/Skin/Heme-Lymph-Imm. Pursuant to the emergency declaration under the [address] [place] [address], [address] [place] [address] authority and the [employer] and [employer] Act, this Virtual Visit was conducted with patient's (and/or legal guardian's) consent, to reduce the patient's risk of exposure to COVID-19 and provide necessary medical care. The patient (and/or legal guardian) has also been advised to contact this office for worsening conditions or problems, and seek emergency medical treatment and/or call 911 if deemed necessary.      Patient identification was verified at the start of the visit: Yes    Total time spent on this encounter: Not billed by time Services were provided through a video synchronous discussion virtually to substitute for in-person clinic visit. Patient and provider were located at their individual homes. --Eva Jackson DO on 2/23/2021 at 4:03 PM    An electronic signature was used to authenticate this note.

## 2021-02-23 NOTE — TELEPHONE ENCOUNTER
Pharmacy called wanting to know if patient is stopping the celexa and if she is starting 60mg of cymbalta without titrating up to 60mg?  Please advise

## 2021-07-13 ENCOUNTER — TELEMEDICINE (OUTPATIENT)
Dept: FAMILY MEDICINE CLINIC | Age: 36
End: 2021-07-13

## 2021-07-13 DIAGNOSIS — F33.0 MILD EPISODE OF RECURRENT MAJOR DEPRESSIVE DISORDER (HCC): Primary | ICD-10-CM

## 2021-07-13 DIAGNOSIS — F41.9 ANXIETY: ICD-10-CM

## 2021-07-13 PROCEDURE — 99213 OFFICE O/P EST LOW 20 MIN: CPT | Performed by: FAMILY MEDICINE

## 2021-07-13 RX ORDER — BUPROPION HYDROCHLORIDE 300 MG/1
300 TABLET ORAL DAILY
Qty: 30 TABLET | Refills: 11 | Status: SHIPPED | OUTPATIENT
Start: 2021-07-13

## 2021-07-13 ASSESSMENT — PATIENT HEALTH QUESTIONNAIRE - PHQ9
1. LITTLE INTEREST OR PLEASURE IN DOING THINGS: 0
SUM OF ALL RESPONSES TO PHQ9 QUESTIONS 1 & 2: 0
SUM OF ALL RESPONSES TO PHQ QUESTIONS 1-9: 0
2. FEELING DOWN, DEPRESSED OR HOPELESS: 0

## 2021-07-13 NOTE — PROGRESS NOTES
2021    TELEHEALTH EVALUATION -- Audio/Visual (During HKZGU-48 public health emergency)    HPI:    Bushra Birch (:  1985) has requested an audio/video evaluation for the following concern(s):    Seen today for follow-up on anxiety but she states lately she has been having a lot of episodes of depression subsequently she has been missing a lot of work and is in jeopardy of losing her job because of it and it was suggested she get some FMLA she is taking Cymbalta every day she was seeing a counselor but has not gone in in a few weeks her anxiety seems to be under fairly good control at this point    Review of Systems    Prior to Visit Medications    Medication Sig Taking? Authorizing Provider   buPROPion (WELLBUTRIN XL) 300 MG extended release tablet Take 1 tablet by mouth daily Yes Shira Staples, DO   DULoxetine (CYMBALTA) 60 MG extended release capsule Take 1 capsule by mouth daily  Shira Staples,    citalopram (CELEXA) 20 MG tablet Take 1 tablet by mouth daily  Shira Staples,    ibuprofen (ADVIL;MOTRIN) 800 MG tablet Take 800 mg by mouth every 6 hours as needed  Historical Provider, MD   methylPREDNISolone (MEDROL DOSEPACK) 4 MG tablet follow package directions  Historical Provider, MD   desogestrel-ethinyl estradiol (DESOGEN) 0.15-30 MG-MCG per tablet Take 1 tablet by mouth daily  Patient not taking: Reported on 2021  Krzysztof Sherwood MD       Social History     Tobacco Use    Smoking status: Former Smoker     Packs/day: 0.50     Years: 10.00     Pack years: 5.00     Types: Cigarettes     Quit date: 2020     Years since quittin.5    Smokeless tobacco: Never Used   Vaping Use    Vaping Use: Some days    Substances: Always   Substance Use Topics    Alcohol use: Yes     Comment: social    Drug use: No        No Known Allergies, History reviewed. No pertinent past medical history. ,   Past Surgical History:   Procedure Laterality Date    NOSE SURGERY      SKIN CANCER EXCISION Hallucinations        [] Abnormal-     Other pertinent observable physical exam findings-     ASSESSMENT/PLAN:   Diagnosis Orders   1. Mild episode of recurrent major depressive disorder (HCC)  buPROPion (WELLBUTRIN XL) 300 MG extended release tablet   2. Anxiety        No orders of the defined types were placed in this encounter. Requested Prescriptions     Signed Prescriptions Disp Refills    buPROPion (WELLBUTRIN XL) 300 MG extended release tablet 30 tablet 11     Sig: Take 1 tablet by mouth daily     Will add Wellbutrin she needs to return to counseling and I will give her 3 days off a month  Return if symptoms worsen or fail to improve. Arline Hinojosa is a 29 y.o. female being evaluated by a Virtual Visit (video visit) encounter to address concerns as mentioned above. A caregiver was present when appropriate. Due to this being a TeleHealth encounter (During [place] [address] public [facility] emergency), evaluation of the following organ systems was limited: Vitals/Constitutional/EENT/Resp/CV/GI//MS/Neuro/Skin/Heme-Lymph-Imm. Pursuant to the emergency declaration under the [address] [place] [address] and the [employer] Act, this Virtual Visit was conducted with patient's (and/or legal guardian's) consent, to reduce the patient's risk of exposure to COVID-19 and provide necessary medical care. The patient (and/or legal guardian) has also been advised to contact this office for worsening conditions or problems, and seek emergency medical treatment and/or call 911 if deemed necessary. Patient identification was verified at the start of the visit: Yes    Total time spent on this encounter: Not billed by time    Services were provided through a video synchronous discussion virtually to substitute for in-person clinic visit. Patient and provider were located at their individual homes.     --Phil Staples, DO on 7/13/2021 at 3:38 PM    An electronic signature was used to authenticate this note.

## 2021-12-01 ENCOUNTER — OFFICE VISIT (OUTPATIENT)
Dept: OBGYN CLINIC | Age: 36
End: 2021-12-01
Payer: COMMERCIAL

## 2021-12-01 ENCOUNTER — HOSPITAL ENCOUNTER (OUTPATIENT)
Age: 36
Setting detail: SPECIMEN
Discharge: HOME | End: 2021-12-01

## 2021-12-01 VITALS
WEIGHT: 154.2 LBS | BODY MASS INDEX: 29.11 KG/M2 | DIASTOLIC BLOOD PRESSURE: 79 MMHG | SYSTOLIC BLOOD PRESSURE: 134 MMHG | HEIGHT: 61 IN

## 2021-12-01 DIAGNOSIS — Z01.419 WOMEN'S ANNUAL ROUTINE GYNECOLOGICAL EXAMINATION: Primary | ICD-10-CM

## 2021-12-01 DIAGNOSIS — Z80.41 FAMILY HISTORY OF OVARIAN CANCER: ICD-10-CM

## 2021-12-01 PROCEDURE — 99395 PREV VISIT EST AGE 18-39: CPT | Performed by: ADVANCED PRACTICE MIDWIFE

## 2021-12-01 NOTE — PROGRESS NOTES
Date of Visit:  2021  Patient :  1985     Subjective:     CHIEF COMPLAINT:     Chief Complaint   Patient presents with    Gynecologic Exam     2019 last pap        HPI    Her bowel habits are regular. She denies any bloating. She denies dysuria. She denies urinary leaking. She denies vaginal discharge. She is sexually active with single partner, contraception - none. Depression  2 question Screen:  Over the past two weeks, has the patient felt down,depressed or hopeless? No  Over the past two weeks, has the patient felt little interest or pleasure in doing things? No    PREVENTIVE HEALTH SCREENING:   Date of last pap:  unsat                 HPV typing/date :2019  negative  Abnormal pap smear history: no    Date of last mammogram: n/a   Date of last DEXA scan: n/a  Date of last colonoscopy: n/a    History of Gestational Diabetes: No    Preventive screening: Yes    Family history of Breast, Ovarian, Colon or Uterine Cancer:  Yes mother     If Yes see scanned worksheet    Objective:   GYNECOLOGIC HISTORY:     LMP:  Patient's last menstrual period was 2021 (exact date).   Monthly menses (days): 28  Length: 4 days  Flow: heavy then light       Sexually active: Yes  HPV vaccine: unknown     STD history: No    Birth control method :No  Permanent Sterilization: No    SOCIALHISTORY:  Seat Belt Use: Yes  Domestic Violence: No    Counseling: No  Regular exercise: No   Counseling:Yes     Diet discussed: Yes    Social History     Tobacco Use   Smoking Status Former Smoker    Packs/day: 0.50    Years: 10.00    Pack years: 5.00    Types: Cigarettes    Quit date: 2020    Years since quittin.9   Smokeless Tobacco Never Used     Social History     Substance and Sexual Activity   Alcohol Use Yes    Comment: social     Social History     Substance and Sexual Activity   Drug Use Yes    Types: Marijuana (Weed)       Current Outpatient Medications   Medication Sig Dispense Refill  ibuprofen (ADVIL;MOTRIN) 800 MG tablet Take 800 mg by mouth every 6 hours as needed      buPROPion (WELLBUTRIN XL) 300 MG extended release tablet Take 1 tablet by mouth daily (Patient not taking: Reported on 12/1/2021) 30 tablet 11    DULoxetine (CYMBALTA) 60 MG extended release capsule Take 1 capsule by mouth daily (Patient not taking: Reported on 12/1/2021) 30 capsule 11    citalopram (CELEXA) 20 MG tablet Take 1 tablet by mouth daily (Patient not taking: Reported on 12/1/2021) 30 tablet 11    methylPREDNISolone (MEDROL DOSEPACK) 4 MG tablet follow package directions (Patient not taking: Reported on 12/1/2021)      desogestrel-ethinyl estradiol (DESOGEN) 0.15-30 MG-MCG per tablet Take 1 tablet by mouth daily (Patient not taking: Reported on 1/7/2021) 1 packet 12     No current facility-administered medications for this visit. REVIEW OF SYSTEMS:  Review of Systems   Constitutional: Negative. HENT: Negative. Eyes: Negative. Respiratory: Negative. Cardiovascular: Negative. Gastrointestinal: Negative. Endocrine: Negative. Genitourinary: Negative. Musculoskeletal: Negative. Skin: Negative. Allergic/Immunologic: Negative. Neurological: Negative. Hematological: Negative. Psychiatric/Behavioral: Negative. Physical Exam:     Constitutional:   Blood pressure 134/79, height 5' 1\" (1.549 m), weight 154 lb 3.2 oz (69.9 kg), last menstrual period 11/25/2021, not currently breastfeeding. Wt Readings from Last 3 Encounters:   12/01/21 154 lb 3.2 oz (69.9 kg)   12/06/20 145 lb (65.8 kg)   11/12/20 145 lb (65.8 kg)       General Appearance:   This is a well-developed, well-nourished and well-groomed female    Skin:  Inspection of the skin revealed no rashes or lesions    Neck and EENT:  No eye discharge and sclera non-icteric  Lips, teeth and gums without lesions and normal dentition  Nares are patent without discharge  Normal external ears are present with no hearing loss  The neck was supple with full range of motion and no masses. The thyroid was not enlarged and had no masses. No enlarged cervical lymph nodes    Respiratory: The lungs were clear to auscultation bilaterally. There were no rales, rhonchi or wheezes. There was good respiratory effort. Cardiovascular: The heart was in a regular rhythm and rate was normal.  No murmur or extra sounds were noted. Breast:  The breasts are normal size and symmetrical.  There are no skin changes with position changes. The nipples are without deviations or discharge. No masses were palpated. No axillary or supraclavicular lymphadenopathy is present. Back:  Straight with no CVA tenderness present    Abdomen: The abdomen is soft and non-tender. There was no guarding, rebound or rigidity. The bladder was without fullness or tenderness. No hernias were appreciated. Pelvic: The external genitalia has a normal appearance without masses or lesions. There is no inguinal lymphadenopathy. Bladder/urethra without discharge or mass. Normal urethra. The vagina is pink and well rugated. The cervix is without lesions or discharge and with no CMT. Pap was obtained without difficulty. The uterus is midline, mobile, normal size/shape and non-tender. The adnexa are without masses or tenderness. Rectum is normal.    Musculoskeletal: Normal gait. No contractions with normal movement of all extremities. No cyanosis or edema present    Psychiatric:  Alert, oriented to time, place, person and situation. There are no mood or affect changes. ASSESSMENT/PLAN:     Routine annual gynecological exam  .1. Women's annual routine gynecological examination  - PAP Smear; Future    2. Family history of ovarian cancer  -Discussed genetic testing, pt declines at this time will consider         Counseling Completed:    discussed need for repeat pap as per American Society for Colposcopy and Cervical Pathology guidelines.   discussed need for

## 2021-12-03 LAB
HPV SAMPLE: ABNORMAL
HPV, GENOTYPE 16: NOT DETECTED
HPV, GENOTYPE 18: NOT DETECTED
HPV, HIGH RISK OTHER: DETECTED
HPV, INTERPRETATION: ABNORMAL
SPECIMEN DESCRIPTION: ABNORMAL

## 2021-12-08 ENCOUNTER — TELEPHONE (OUTPATIENT)
Dept: OBGYN CLINIC | Age: 36
End: 2021-12-08

## 2021-12-08 DIAGNOSIS — R87.612 LOW GRADE SQUAMOUS INTRAEPITHELIAL LESION ON CYTOLOGIC SMEAR OF CERVIX (LGSIL): ICD-10-CM

## 2021-12-08 LAB — CYTOLOGY REPORT: NORMAL

## 2021-12-08 ASSESSMENT — ENCOUNTER SYMPTOMS
EYES NEGATIVE: 1
ALLERGIC/IMMUNOLOGIC NEGATIVE: 1
GASTROINTESTINAL NEGATIVE: 1
RESPIRATORY NEGATIVE: 1

## 2022-03-28 NOTE — ED PROVIDER NOTES
[Mother] : mother subscore is 6. No focal neurologic deficits. Skin: Skin is warm and dry. Psychiatric: She has a normal mood and affect. Her speech is normal.   Vitals reviewed. DIFFERENTIAL DIAGNOSIS/ MDM:     Plan to be to initiate CT scan of the head and cervical spine. She does have some soft tissue swelling and mild ecchymosis to the forehead. DIAGNOSTIC RESULTS     EKG: All EKG's are interpreted by the Emergency Department Physician who either signs or Co-signs this chart in the absence of a cardiologist.        RADIOLOGY:   I directly visualized the following  images and reviewed the radiologist interpretations:  CT CERVICAL SPINE WO CONTRAST   Final Result   1. No acute fracture identified of the cervical spine. 2. 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 CT HEAD WO CONTRAST   Final Result   No acute intracranial abnormality. Left frontal scalp hematoma. Xr Foot Left (min 3 Views)    Result Date: 11/15/2020    FOOT X-RAY   Three views of the bilateral foot including   weightbearing AP, oblique and lateral views reveal   no osseous or soft tissue abnormality. Alignment is satisfactory. There is no evidence of fracture or dislocation or bony erosion. Lisfranc's joint is reduced with no widening.     Impression: Negative x-rays of the bilateral foot. Xr Foot Right (min 3 Views)    Result Date: 11/15/2020    FOOT X-RAY   Three views of the bilateral foot including   weightbearing AP, oblique and lateral views reveal   no osseous or soft tissue abnormality. Alignment is satisfactory. There is no evidence of fracture or dislocation or bony erosion. Lisfranc's joint is reduced with no widening.     Impression: Negative x-rays of the bilateral foot. LABS:  No results found for this visit on 12/06/20. EMERGENCY DEPARTMENT COURSE:     The patient was given the following medications:  No orders of the defined types were placed in this encounter. Vitals:    Vitals:    12/06/20 0110 12/06/20 0250   BP: (!) 142/101 134/87   Pulse: 82 82   Resp: 16 15   Temp: 98.3 °F (36.8 °C)    TempSrc: Oral    SpO2: 99% 99%   Weight: 65.8 kg (145 lb)    Height: 5' (1.524 m)      -------------------------  BP: 134/87, Temp: 98.3 °F (36.8 °C), Pulse: 82, Resp: 15      Re-evaluation Notes    Patient doing well on reevaluation. Clinically she is sober and appropriate for discharge. Imaging shows no acute findings. I did discuss the CT cervical spine incidental finding with the patient. She cannot think of any significant trauma other than a [employer] accident a number of years ago. She denies any neurologic symptoms and remains neurologically intact. I do not feel this is an acute finding and the radiologist agrees. Advised to discuss further with her PCP. I do not feel she requires admission or acute/urgent neurosurgical consult for the incidental finding. Patient advised to return sooner if worse or for new or concerning symptoms and we did discuss possible concussion symptoms that currently she does not have. Patient is comfortable with this plan. The patient presents with a closed head injury. The patient is neurologically intact. The presentation does not suggest a serious head injury. Signs and symptoms of a serious head injury have been discussed with the patient and caregiver, who will be vigilant for these. Concerns of repeat head injury have also been discussed. The patient has been observed adequately in the ED. Pt has been instructed to return to the ED if symptoms do not go away or worsen or change in any way. The patient understands that at this time there is no evidence for a more malignant underlying process, but the patient also understands that early in the process of an illness or injury, an emergency department workup can be falsely reassuring.   Routine discharge counseling was given, and the patient understands that worsening, changing or persistent symptoms should prompt an immediate call or follow up with their primary physician or return to the emergency department. The importance of appropriate follow up was also discussed. I have reviewed the disposition diagnosis with the patient and or their family/guardian. I have answered their questions and given discharge instructions. They voiced understanding of these instructions and did not have any further questions or complaints. CONSULTS:    None    CRITICAL CARE:     None    PROCEDURES:    None    FINAL IMPRESSION      1. Injury of head, initial encounter    2. Contusion of face, initial encounter    3. Hematoma of scalp, initial encounter    4.  Acute alcoholic intoxication without complication [facility])          DISPOSITION/PLAN   DISPOSITION Decision To Discharge 12/06/2020 02:33:07 AM      Condition on Disposition    Improved    PATIENT REFERRED TO:  Pablo Green DO  [address] [place] [address] 22.  [phone]    Schedule an appointment as soon as possible for a visit in 2 days        DISCHARGE MEDICATIONS:  Discharge Medication List as of 12/6/2020  2:43 AM          (Please note that portions of this note were completed with a voice recognition program.  Efforts were made to edit the dictations but occasionally words are mis-transcribed.)    Magan Wang DO  Attending Emergency Physician       Magan Wang DO  12/06/20 1475